# Patient Record
Sex: FEMALE | Race: WHITE | URBAN - METROPOLITAN AREA
[De-identification: names, ages, dates, MRNs, and addresses within clinical notes are randomized per-mention and may not be internally consistent; named-entity substitution may affect disease eponyms.]

---

## 2023-06-12 ENCOUNTER — APPOINTMENT (OUTPATIENT)
Dept: ULTRASOUND IMAGING | Age: 38
End: 2023-06-12
Payer: COMMERCIAL

## 2023-06-12 ENCOUNTER — HOSPITAL ENCOUNTER (EMERGENCY)
Age: 38
Discharge: HOME | End: 2023-06-13
Attending: EMERGENCY MEDICINE
Payer: COMMERCIAL

## 2023-06-12 ENCOUNTER — TELEPHONE (OUTPATIENT)
Dept: OBGYN CLINIC | Facility: CLINIC | Age: 38
End: 2023-06-12

## 2023-06-12 DIAGNOSIS — O03.9 SPONTANEOUS ABORTION: Primary | ICD-10-CM

## 2023-06-12 PROBLEM — O20.0 THREATENED MISCARRIAGE (HCC): Status: ACTIVE | Noted: 2023-06-12

## 2023-06-12 PROBLEM — Z87.19 HISTORY OF UMBILICAL HERNIA: Status: ACTIVE | Noted: 2023-06-12

## 2023-06-12 PROBLEM — O09.529 AMA (ADVANCED MATERNAL AGE) MULTIGRAVIDA 35+: Status: ACTIVE | Noted: 2023-06-12

## 2023-06-12 PROBLEM — O20.0 THREATENED MISCARRIAGE: Status: ACTIVE | Noted: 2023-06-12

## 2023-06-12 PROBLEM — O09.529 AMA (ADVANCED MATERNAL AGE) MULTIGRAVIDA 35+ (HCC): Status: ACTIVE | Noted: 2023-06-12

## 2023-06-12 PROBLEM — Z98.891 PREVIOUS CESAREAN SECTION: Status: ACTIVE | Noted: 2023-06-12

## 2023-06-12 LAB
ANTIBODY SCREEN: NEGATIVE
B-HCG SERPL-ACNC: 1309 MIU/ML
BASOPHILS # BLD AUTO: 0.03 X10(3) UL (ref 0–0.2)
BASOPHILS NFR BLD AUTO: 0.3 %
BLOOD GROUP ANTIBODY SCREEN [PRESENCE] IN SERUM OR PLASMA BY COLD ABSORPTION: NEGATIVE
EOSINOPHIL # BLD AUTO: 0.09 X10(3) UL (ref 0–0.7)
EOSINOPHIL NFR BLD AUTO: 1 %
ERYTHROCYTE [DISTWIDTH] IN BLOOD BY AUTOMATED COUNT: 13 %
HCT VFR BLD AUTO: 34.4 %
HGB BLD-MCNC: 11.3 G/DL
IMM GRANULOCYTES # BLD AUTO: 0.03 X10(3) UL (ref 0–1)
IMM GRANULOCYTES NFR BLD: 0.3 %
LYMPHOCYTES # BLD AUTO: 4.65 X10(3) UL (ref 1–4)
LYMPHOCYTES NFR BLD AUTO: 50.3 %
MCH RBC QN AUTO: 27.7 PG (ref 26–34)
MCHC RBC AUTO-ENTMCNC: 32.8 G/DL (ref 31–37)
MCV RBC AUTO: 84.3 FL
MONOCYTES # BLD AUTO: 0.5 X10(3) UL (ref 0.1–1)
MONOCYTES NFR BLD AUTO: 5.4 %
NEUTROPHILS # BLD AUTO: 3.95 X10 (3) UL (ref 1.5–7.7)
NEUTROPHILS # BLD AUTO: 3.95 X10(3) UL (ref 1.5–7.7)
NEUTROPHILS NFR BLD AUTO: 42.7 %
PLATELET # BLD AUTO: 240 10(3)UL (ref 150–450)
RBC # BLD AUTO: 4.08 X10(6)UL
RH BLOOD TYPE: POSITIVE
WBC # BLD AUTO: 9.3 X10(3) UL (ref 4–11)

## 2023-06-12 PROCEDURE — 76817 TRANSVAGINAL US OBSTETRIC: CPT

## 2023-06-12 PROCEDURE — 76801 OB US < 14 WKS SINGLE FETUS: CPT

## 2023-06-12 PROCEDURE — 99285 EMERGENCY DEPT VISIT HI MDM: CPT

## 2023-06-12 PROCEDURE — 86901 BLOOD TYPING SEROLOGIC RH(D): CPT

## 2023-06-12 PROCEDURE — 86850 RBC ANTIBODY SCREEN: CPT

## 2023-06-12 PROCEDURE — 85025 COMPLETE CBC W/AUTO DIFF WBC: CPT

## 2023-06-12 PROCEDURE — 86900 BLOOD TYPING SEROLOGIC ABO: CPT

## 2023-06-12 PROCEDURE — 96374 THER/PROPH/DIAG INJ IV PUSH: CPT

## 2023-06-12 PROCEDURE — 96372 THER/PROPH/DIAG INJ SC/IM: CPT

## 2023-06-12 PROCEDURE — 86850 RBC ANTIBODY SCREEN: CPT | Performed by: EMERGENCY MEDICINE

## 2023-06-12 PROCEDURE — 84702 CHORIONIC GONADOTROPIN TEST: CPT

## 2023-06-12 PROCEDURE — 90471 IMMUNIZATION ADMIN: CPT

## 2023-06-12 PROCEDURE — 96361 HYDRATE IV INFUSION ADD-ON: CPT

## 2023-06-12 NOTE — TELEPHONE ENCOUNTER
New Patient     LMP: 2023 - UPT May   EED: 2024  GA: 10w2d  : 2023 with Dr. Kimberlyn Lemon P2    Are cycles regular?: Yes  Medical problems: None  Past sx hx:  x1, umbilical hernia  Current medications: PNV and vitamins  Past pregnancy complications: , GDM with second pregnancy     Current concerns: Patient states she had spotting a few weeks ago but went away. Reports spotting since Friday morning, mild cramps. Has had to use a pad since the bleeding picked up a little. No recent intercourse. Pain and bleeding precautions given. Discussed spotting/mild cramps during early pregnancy and spotting after intercourse. No further questions. If you experience bleeding that is heavy enough that you are soaking through a large/super tampon or large/overnight pad in an hour or less, if you experience unmanageable/severe pain, have chest pain/chest heaviness, shortness of breath, or feel faint/dizzy, please go to the ER. I don't anticipate you experiencing this, but want you to know what to do just in case.

## 2023-06-12 NOTE — TELEPHONE ENCOUNTER
Patient calling to initiate prenatal care  LMP 4/1/23  Patient is 7-8 weeks on 5/27/23  Confirmation Ultrasound and Appointment scheduled on   Future Appointments   Date Time Provider Edi Abad   6/14/2023 10:00 AM [facility] US PLFD [facility] [address]   6/14/2023 11:00 AM Perico Villatoro MD [facility] [address]         Any history of ectopic pregnancy? No  Any history of miscarriage? Yes  Any medications that you are taking on a regular basis other than prenatal vitamins?  Iron and mag (if not taking prenatal vitamins, encourage patient to start taking.)  Any bleeding since the first day of last LMP and your positive pregnancy test? Yes, started Friday and continous but not heavy/ notices jelly like brown/ red color discharge    Insurance [facility]

## 2023-06-13 VITALS
OXYGEN SATURATION: 98 % | HEART RATE: 82 BPM | DIASTOLIC BLOOD PRESSURE: 61 MMHG | HEIGHT: 65 IN | TEMPERATURE: 99 F | SYSTOLIC BLOOD PRESSURE: 119 MMHG | BODY MASS INDEX: 37.15 KG/M2 | RESPIRATION RATE: 16 BRPM | WEIGHT: 223 LBS

## 2023-06-13 LAB — GLUCOSE BLD-MCNC: 132 MG/DL (ref 70–99)

## 2023-06-13 PROCEDURE — 82962 GLUCOSE BLOOD TEST: CPT

## 2023-06-13 RX ORDER — KETOROLAC TROMETHAMINE 30 MG/ML
30 INJECTION, SOLUTION INTRAMUSCULAR; INTRAVENOUS ONCE
Status: COMPLETED | OUTPATIENT
Start: 2023-06-13 | End: 2023-06-13

## 2023-06-13 RX ORDER — HYDROMORPHONE HYDROCHLORIDE 1 MG/ML
0.5 INJECTION, SOLUTION INTRAMUSCULAR; INTRAVENOUS; SUBCUTANEOUS ONCE
Status: DISCONTINUED | OUTPATIENT
Start: 2023-06-13 | End: 2023-06-13

## 2023-06-13 RX ORDER — METHYLERGONOVINE MALEATE 0.2 MG/ML
0.2 INJECTION INTRAVENOUS ONCE
Status: COMPLETED | OUTPATIENT
Start: 2023-06-13 | End: 2023-06-13

## 2023-06-13 RX ORDER — SODIUM CHLORIDE 9 MG/ML
INJECTION, SOLUTION INTRAVENOUS ONCE
Status: COMPLETED | OUTPATIENT
Start: 2023-06-13 | End: 2023-06-13

## 2023-06-14 ENCOUNTER — LAB ENCOUNTER (OUTPATIENT)
Dept: LAB | Age: 38
End: 2023-06-14
Attending: OBSTETRICS & GYNECOLOGY
Payer: COMMERCIAL

## 2023-06-14 LAB — B-HCG SERPL-ACNC: 448 MIU/ML

## 2023-06-14 PROCEDURE — 36415 COLL VENOUS BLD VENIPUNCTURE: CPT | Performed by: OBSTETRICS & GYNECOLOGY

## 2023-06-14 PROCEDURE — 84702 CHORIONIC GONADOTROPIN TEST: CPT | Performed by: OBSTETRICS & GYNECOLOGY

## 2024-10-09 ENCOUNTER — HOSPITAL ENCOUNTER (OUTPATIENT)
Dept: CT IMAGING | Age: 39
Discharge: HOME | End: 2024-10-09
Attending: OPHTHALMOLOGY
Payer: COMMERCIAL

## 2024-10-09 DIAGNOSIS — H57.89 THYROID EYE DISEASE: ICD-10-CM

## 2024-10-09 DIAGNOSIS — E07.9 THYROID EYE DISEASE: ICD-10-CM

## 2024-10-09 PROCEDURE — 70480 CT ORBIT/EAR/FOSSA W/O DYE: CPT | Performed by: OPHTHALMOLOGY

## 2024-12-26 ENCOUNTER — OFFICE VISIT (OUTPATIENT)
Facility: CLINIC | Age: 39
End: 2024-12-26
Payer: COMMERCIAL

## 2024-12-26 VITALS
WEIGHT: 211.63 LBS | DIASTOLIC BLOOD PRESSURE: 68 MMHG | RESPIRATION RATE: 18 BRPM | OXYGEN SATURATION: 99 % | SYSTOLIC BLOOD PRESSURE: 116 MMHG | HEIGHT: 65 IN | BODY MASS INDEX: 35.26 KG/M2 | HEART RATE: 70 BPM

## 2024-12-26 DIAGNOSIS — E55.9 VITAMIN D DEFICIENCY: ICD-10-CM

## 2024-12-26 DIAGNOSIS — E53.8 B12 DEFICIENCY: ICD-10-CM

## 2024-12-26 DIAGNOSIS — H57.89 THYROID EYE DISEASE: ICD-10-CM

## 2024-12-26 DIAGNOSIS — E61.1 IRON DEFICIENCY: ICD-10-CM

## 2024-12-26 DIAGNOSIS — E05.00 GRAVES DISEASE: Primary | ICD-10-CM

## 2024-12-26 DIAGNOSIS — E07.9 THYROID EYE DISEASE: ICD-10-CM

## 2024-12-26 PROCEDURE — 99205 OFFICE O/P NEW HI 60 MIN: CPT | Performed by: STUDENT IN AN ORGANIZED HEALTH CARE EDUCATION/TRAINING PROGRAM

## 2024-12-26 NOTE — PATIENT INSTRUCTIONS
Let's do lab work at your earliest convenience.   Depending on how those look, I'm leaning toward starting a low dose a PTU to control both the thyroid levels and the antibody levels.   I will reach out to your doctor about considering IV iron/B12.   Start that weekly vitamin D (ergocalciferol) because there is no IV for that.     General follow up information:  Please let us know if you require any refills at least 1 week prior to your medication running out. If you do run out of medication, please call our office ASAP to request refills (do not wait until your follow up).  Please call us if you experience any problems with insurance coverage of medication, lab work, or imaging.   Lab results and imaging will typically be reviewed at follow up appointments, or within 3-5 business days of ALL results being in if you do not have an appointment scheduled in the near future. Our office will contact you for any abnormal results requiring more urgent follow up or action.   The on-call pager is for urgent matters only. If you are a type 1 diabetic and run out of insulin after business hours 8AM-4PM, you may call the on-call pager for a refill to a 24 hour pharmacy. If you have adrenal insufficiency and run out of steroids, you may call the on-call pager for a refill to a 24 hour pharmacy. All other refill requests should be requested during business hours.    Return Visit   [] Dr. Duran in 6-8 weeks

## 2024-12-26 NOTE — H&P
Endocrinology Clinic Note    Name: Jennifer Ha    Date: 12/26/2024       HISTORY OF PRESENT ILLNESS   Jennifer Ha is a 39 year old female with PMHx significant for Graves' disease complicated by JESSICA who presents for initial endocrine consultation for Graves' disease. Patient was diagnosed with hyperthyroidism by labs several months ago, reports that symptoms worsened significantly over the summer and her primary care physician ordered lab results showing low TSH, normal free thyroid hormones, and positive TSI.  She also noted proptosis of the right eye and was seen by ophthalmology, who confirmed that the patient has thyroid eye disease - intervention was not recommended.  She has an 8-year-old and a 5-year-old and reportedly had normal thyroid function with both of those pregnancies. She unfortunately had a miscarriage with her most recent pregnancy and is hoping to conceive again as soon as possible.  Reports a high stress job, also recently lost her father who lived in [facility]. Her parents are .  She is not familiar with her father's side of the family with regard to medical history, but denies history of thyroid issues on her mother's side as far as she is aware.  Patient struggles with taking medication/pills due to very busy days and some health anxiety (prefers natural remedies).  She was recently found to have low B12, low iron and low vitamin D.  She denies any knowledge of GI issues or Celiac disease.  She was prescribed supplementation for these and has not been taking them.  Was taking Zepbound for >6 months, reports she has been overweight all her life and Zepbound did help her lose a notable amount of weight and help improve her eating habits. She has not been taking the last several months 10 pounds due to hoping to conceive again, reports that she is more conscious of what she is eating. Further data as below.     PAST MEDICAL HISTORY:   History reviewed. No pertinent past medical  history.    PAST SURGICAL HISTORY:   Past Surgical History:   Procedure Laterality Date    Hc  section level i      Repair ing hernia,7+y/o,reducibl         CURRENT MEDICATIONS:    No current outpatient medications on file.         ALLERGIES:  Allergies[1]    SOCIAL HISTORY:    Social History     Socioeconomic History    Marital status:    Tobacco Use    Smoking status: Never     Passive exposure: Never    Smokeless tobacco: Never   Vaping Use    Vaping status: Never Used   Substance and Sexual Activity    Alcohol use: Never    Drug use: Never       FAMILY HISTORY:   History reviewed. No pertinent family history.      REVIEW OF SYSTEMS:  Ten point review of systems has been performed and is otherwise negative and/or non-contributory, except as described above.      PHYSICAL EXAM:   Vitals:    24 1303   BP: 116/68   Pulse: 70   Resp: 18   SpO2: 99%   Weight: 211 lb 10.3 oz (96 kg)   Height: 5' 5\" (1.651 m)     BMI: Body mass index is 35.22 kg/m².     CONSTITUTIONAL:  awake, alert, cooperative, no apparent distress, and appears stated age  PSYCH: normal affect  EYES:  No proptosis,  conjunctiva normal  ENT:  Normocephalic, atraumatic  NECK:  Supple, symmetrical, thyroid not enlarged and no tenderness  LUNGS: breathing comfortably  CARDIOVASCULAR:  regular rate   ABDOMEN:  soft  SKIN:  no rashes and no lesions  EXTREMITIES: no edema      DATA:     Pertinent data reviewed 2024.     Labs from patient's phone from [place]:   -  -TSH 0.02  -FT4 1.2  -FT3 2.7  -B12, iron, vitamin D low      ASSESSMENT AND PLAN:    #Graves disease  #Thyroid eye disease  -Patient with low TSH, elevated TSI, and proven JESSICA consistent with diagnosis of Graves' disease  -Reviewed thyroid physiology and pathophysiology of Graves' disease and thyroid eye disease  -Reviewed options for therapy including ATD, thyroidectomy, UMANA (UMANA contraindicated given active JESSICA and pt also hoping to conceive in the near  future)  -Reviewed indications for treatment of subclinical hyperthyroidism and pregnancy considerations, risks/benefits of therapy with ATD in preconception and early pregnancy, and rationale for PTU versus MMI  -Recommend repeat TSH, FT4, total T3, TSI, TRAb  -Obtain baseline CBC/CMP in anticipation of ATD initiation  -Pending levels, recommend PTU as starting therapy as this is safer than MMI during first trimester of pregnancy  -Reviewed that untreated hyperthyroidism can be associated with increased risk of miscarriage/pregnancy failure, though technical targets for free thyroid hormones are slightly above nonpregnant range  -Consider ATD regardless of levels to reduce antibody burden in the setting of active JESSICA    #Vitamin D deficiency  -Patient advised to start ergocalciferol 50K weekly, reports this was previously prescribed by PCP    #Iron deficiency  #B12 deficiency  -Will obtain antibodies for pernicious anemia as concurrent autoimmune diseases are commong  -Pt may benefit from GI evaluation to review possible causes of malabsorption, pt reports good diet and does not avoid meat  -Reports she significantly struggles with taking oral medications/supplements and would rather take IV or IM options for repletion if possible - will reach out to PCP to discuss IV iron (may need to see heme) and/or IM B12 for the patient to improve adherence. She is struggling with extreme fatigue, likely affected by above.      The above plan was discussed in detail with the patient who verbalized understanding and agreement.      A total of 65 minutes was spent today on obtaining history, reviewing outside records, reviewing pertinent labs/imaging, reviewing relevant pathophysiology with patient, evaluating patient, providing multiple treatment options, communicating with patient's other providers as appropriate, and completing documentation and orders.      Yesica Duran DO  [facility]  12/26/2024     Note to patient:  The 21 Century Cures Act makes medical notes like these available to patients in the interest of transparency. However, be advised this is a medical document. It is intended as peer to peer communication. It is written in medical language and may contain abbreviations or verbiage that are unfamiliar. It may appear blunt or direct. Medical documents are intended to carry relevant information, facts as evident, and the clinical opinion of the practitioner.      In reviewing this note, please be advised that Dragon Voice Recognition software used to dictate the note may have made errors in recognizing some of the words or phrases.          [1] No Known Allergies

## 2025-01-02 LAB
ABSOLUTE BASOPHILS: 32 CELLS/UL (ref 0–200)
ABSOLUTE EOSINOPHILS: 170 CELLS/UL (ref 15–500)
ABSOLUTE LYMPHOCYTES: 2841 CELLS/UL (ref 850–3900)
ABSOLUTE MONOCYTES: 391 CELLS/UL (ref 200–950)
ABSOLUTE NEUTROPHILS: 2867 CELLS/UL (ref 1500–7800)
ALBUMIN/GLOBULIN RATIO: 1.3 (CALC) (ref 1–2.5)
ALBUMIN: 3.5 G/DL (ref 3.6–5.1)
ALKALINE PHOSPHATASE: 48 U/L (ref 31–125)
ALT: 13 U/L (ref 6–29)
AST: 11 U/L (ref 10–30)
BASOPHILS: 0.5 %
BILIRUBIN, TOTAL: 0.2 MG/DL (ref 0.2–1.2)
BUN: 16 MG/DL (ref 7–25)
CALCIUM: 8.7 MG/DL (ref 8.6–10.2)
CARBON DIOXIDE: 26 MMOL/L (ref 20–32)
CHLORIDE: 105 MMOL/L (ref 98–110)
CREATININE: 0.76 MG/DL (ref 0.5–0.97)
EGFR: 103 ML/MIN/1.73M2
EOSINOPHILS: 2.7 %
GLOBULIN: 2.7 G/DL (CALC) (ref 1.9–3.7)
GLUCOSE: 111 MG/DL (ref 65–99)
HEMATOCRIT: 37.9 % (ref 35–45)
HEMOGLOBIN: 12.1 G/DL (ref 11.7–15.5)
IMMUNOGLOBULIN A: 234 MG/DL (ref 47–310)
LYMPHOCYTES: 45.1 %
Lab: <20 U
Lab: NEGATIVE
MCH: 27 PG (ref 27–33)
MCHC: 31.9 G/DL (ref 32–36)
MCV: 84.6 FL (ref 80–100)
MONOCYTES: 6.2 %
MPV: 10.8 FL (ref 7.5–12.5)
NEUTROPHILS: 45.5 %
PLATELET COUNT: 243 THOUSAND/UL (ref 140–400)
POTASSIUM: 4.4 MMOL/L (ref 3.5–5.3)
PROTEIN, TOTAL: 6.2 G/DL (ref 6.1–8.1)
RDW: 13.5 % (ref 11–15)
RED BLOOD CELL COUNT: 4.48 MILLION/UL (ref 3.8–5.1)
SODIUM: 137 MMOL/L (ref 135–146)
T3, TOTAL: 100 NG/DL (ref 76–181)
T4, FREE: 1 NG/DL (ref 0.8–1.8)
TISSUE TRANSGLUTAMINASE$ANTIBODY, IGA: <1 U/ML
TRAB: 4.22 IU/L
TSH: 0.11 MIU/L
TSI: 160 % BASELINE
WHITE BLOOD CELL COUNT: 6.3 THOUSAND/UL (ref 3.8–10.8)

## 2025-01-08 ENCOUNTER — TELEPHONE (OUTPATIENT)
Facility: CLINIC | Age: 40
End: 2025-01-08

## 2025-01-08 DIAGNOSIS — E05.00 GRAVES DISEASE: Primary | ICD-10-CM

## 2025-01-08 RX ORDER — PROPYLTHIOURACIL 50 MG/1
TABLET ORAL
Qty: 180 TABLET | Refills: 0 | Status: SHIPPED | OUTPATIENT
Start: 2025-01-08

## 2025-01-08 NOTE — TELEPHONE ENCOUNTER
Lab result note: -TSH remains low, suggestive of thyroid overactivity with her Graves' disease  -I messaged her PCP to suggest IV or intramuscular replacement for her iron/B12, did not hear back so she can follow up with him directly about starting on those things. Her blood tests were negative for Celiac, pernicious anemia, etc but the way to rule those out 100% is with endoscopy with GI (again can discuss with PCP if this is recommended)     -To control her antibody levels, help stop progression of her eye disease, and hopefully normalize TSH, since she is thinking of conceiving hopefully in the near future, I suggest she start on the lowest dose of PTU 50mg BID (once with breakfast and once with dinner). For context, some patients take up to 600mg+ daily so this is a very low dose.  -If any itching, rash, yellowing of the skin or eyes after starting this medication she should stop immediately and let me know.     -Repeat TSH, FT4, total T3, T4 by dialysis 4-6 weeks after starting PTU.    RN phoned patient to discuss lab result notes: patient verbalized understanding and confirmed pharmacy for prescription.    Patient does intermittent fasting- two days a week she does dry fasting and breaks fast at 4:30- on these days ok to take one tablet then and then with dinner? Ok to take both at once? Routed for review.    Prescription and repeat labs ordered in TE- dialysis lab to be faxed to [facility] in [place]. Then once answer given to question above, will send follow up mychart with all information from labs and recommendations from provider.     Will need to fax T4 by dialysis:[facility]: handy Fax   [phone]

## 2025-01-08 NOTE — TELEPHONE ENCOUNTER
[facility]     Faxed T4 by dialysis to [facility], awaiting confirmation

## 2025-01-08 NOTE — TELEPHONE ENCOUNTER
It's okay to take it with or without food so on her intermittent fasting days, she can take it in the morning with water (if that's okay). If she doesn't even drink water during her fast days then yes, she can take both at once on those evenings OR take one tab at 4:30PM and one later in the evening around 9-10PM with a snack. Thanks!

## 2025-02-27 ENCOUNTER — LAB ENCOUNTER (OUTPATIENT)
Dept: LAB | Age: 40
End: 2025-02-27
Attending: STUDENT IN AN ORGANIZED HEALTH CARE EDUCATION/TRAINING PROGRAM
Payer: COMMERCIAL

## 2025-02-27 ENCOUNTER — OFFICE VISIT (OUTPATIENT)
Facility: CLINIC | Age: 40
End: 2025-02-27
Payer: COMMERCIAL

## 2025-02-27 VITALS
OXYGEN SATURATION: 99 % | WEIGHT: 214 LBS | HEIGHT: 65 IN | HEART RATE: 79 BPM | DIASTOLIC BLOOD PRESSURE: 80 MMHG | SYSTOLIC BLOOD PRESSURE: 122 MMHG | RESPIRATION RATE: 18 BRPM | BODY MASS INDEX: 35.65 KG/M2

## 2025-02-27 DIAGNOSIS — E05.00 GRAVES DISEASE: Primary | ICD-10-CM

## 2025-02-27 DIAGNOSIS — R73.03 PREDIABETES: ICD-10-CM

## 2025-02-27 DIAGNOSIS — E05.00 GRAVES DISEASE: ICD-10-CM

## 2025-02-27 LAB
EST. AVERAGE GLUCOSE BLD GHB EST-MCNC: 126 MG/DL (ref 68–126)
HBA1C MFR BLD: 6 % (ref ?–5.7)
T3 SERPL-MCNC: 1.11 NG/ML (ref 0.6–1.81)
T4 FREE SERPL-MCNC: 1.2 NG/DL (ref 0.8–1.7)
TSI SER-ACNC: 0.07 UIU/ML (ref 0.55–4.78)

## 2025-02-27 PROCEDURE — 84480 ASSAY TRIIODOTHYRONINE (T3): CPT

## 2025-02-27 PROCEDURE — 99215 OFFICE O/P EST HI 40 MIN: CPT | Performed by: STUDENT IN AN ORGANIZED HEALTH CARE EDUCATION/TRAINING PROGRAM

## 2025-02-27 PROCEDURE — 83036 HEMOGLOBIN GLYCOSYLATED A1C: CPT

## 2025-02-27 PROCEDURE — 84439 ASSAY OF FREE THYROXINE: CPT

## 2025-02-27 PROCEDURE — 36415 COLL VENOUS BLD VENIPUNCTURE: CPT

## 2025-02-27 PROCEDURE — 84443 ASSAY THYROID STIM HORMONE: CPT

## 2025-02-27 RX ORDER — ERGOCALCIFEROL 1.25 MG/1
1 CAPSULE ORAL
COMMUNITY

## 2025-02-27 RX ORDER — FERROUS SULFATE 325(65) MG
TABLET ORAL
COMMUNITY

## 2025-02-27 RX ORDER — MELATONIN
1000 DAILY
COMMUNITY
Start: 2024-09-27

## 2025-02-27 NOTE — PROGRESS NOTES
Please let patient know A1c is consistent with prediabetes. We typically suggest A1c <6.5% in patients trying to conceive.     TSH remains low so the PTU 50mg BID is not enough to normalize her thyroid function, I suggest going up to 150 (50mg TID or 100mg in the AM and 50mg in the PM), then repeating labs in 4-6 weeks. I'll place orders. Thanks!

## 2025-02-27 NOTE — PATIENT INSTRUCTIONS
Let's do some new labs today both to see where the thyroid numbers are and where your sugar is.   Some of our OB doctors are: Dr. Cuellar, Dr. James, Dr. Strauss.     General follow up information:  Please let us know if you require any refills at least 1 week prior to your medication running out. If you do run out of medication, please call our office ASAP to request refills (do not wait until your follow up).  Please call us if you experience any problems with insurance coverage of medication, lab work, or imaging.   Lab results and imaging will typically be reviewed at follow up appointments, or within 3-5 business days of ALL results being in if you do not have an appointment scheduled in the near future. Our office will contact you for any abnormal results requiring more urgent follow up or action.   The on-call pager is for urgent matters only. If you are a type 1 diabetic and run out of insulin after business hours 8AM-4PM, you may call the on-call pager for a refill to a 24 hour pharmacy. If you have adrenal insufficiency and run out of steroids, you may call the on-call pager for a refill to a 24 hour pharmacy. All other refill requests should be requested during business hours.    Return Visit   [] Dr. Duran in 3-4 months

## 2025-02-27 NOTE — PROGRESS NOTES
Endocrinology Clinic Note    Name: Jennifer Ha    Date: 2/27/2025       HISTORY OF PRESENT ILLNESS   Jennifer Ha is a 39 year old female with PMHx significant for Graves' disease complicated by JESSICA who presents for initial endocrine consultation for Graves' disease. Patient was diagnosed with hyperthyroidism by labs several months ago, reports that symptoms worsened significantly over the summer and her primary care physician ordered lab results showing low TSH, normal free thyroid hormones, and positive TSI.  She also noted proptosis of the right eye and was seen by ophthalmology, who confirmed that the patient has thyroid eye disease - intervention was not recommended.  She has an 8-year-old and a 5-year-old and reportedly had normal thyroid function with both of those pregnancies. She unfortunately had a miscarriage with her most recent pregnancy and is hoping to conceive again as soon as possible.  Reports a high stress job, also recently lost her father who lived in [facility]. Her parents are .  She is not familiar with her father's side of the family with regard to medical history, but denies history of thyroid issues on her mother's side as far as she is aware.  Patient struggles with taking medication/pills due to very busy days and some health anxiety (prefers natural remedies).  She was recently found to have low B12, low iron and low vitamin D.  She denies any knowledge of GI issues or Celiac disease.  She was prescribed supplementation for these and has not been taking them.  Was taking Zepbound for >6 months, reports she has been overweight all her life and Zepbound did help her lose a notable amount of weight and help improve her eating habits. She has not been taking the last several months 10 pounds due to hoping to conceive again, reports that she is more conscious of what she is eating. Further data as below.     Interval history 2/27/2025:  -Labs 12/27/2024: TSH 0.11, free T4 1.0, total  T3 100, %, TRAb 4.22  -Patient was started on PTU 50mg BID  -Reports she has gained about 15lb since discontinuing Zepbound  -Suppressed period with medication in late  for pilgrimage to [place], after returning missed two menstrual periods but period returned spontaneously the first week of February  -She is trying to conceive  -Notes swelling of hands and feet particularly at night, tends to resolve by midday, trying to watch her sodium  -Looking for new OB practice    PAST MEDICAL HISTORY:   History reviewed. No pertinent past medical history.    PAST SURGICAL HISTORY:   Past Surgical History:   Procedure Laterality Date    Hc  section level i      Repair ing hernia,7+y/o,reducibl         CURRENT MEDICATIONS:    Current Outpatient Medications   Medication Sig Dispense Refill    cyanocobalamin 1000 MCG Oral Tab Take 1 tablet (1,000 mcg total) by mouth daily.      ergocalciferol 1.25 MG (17239 UT) Oral Cap 1 capsule (50,000 Units total).      Ferrous Sulfate 325 (65 Fe) MG Oral Tab 1 tablet Orally every other day for 90 day(s)      propylthiouracil 50 MG Oral Tab Take 2 tablets daily for a total of 100 mg. 180 tablet 0     Endocrine Medications            propylthiouracil 50 MG Oral Tab            ALLERGIES:  Allergies[1]    SOCIAL HISTORY:    Social History     Socioeconomic History    Marital status:    Tobacco Use    Smoking status: Never     Passive exposure: Never    Smokeless tobacco: Never   Vaping Use    Vaping status: Never Used   Substance and Sexual Activity    Alcohol use: Never    Drug use: Never       FAMILY HISTORY:   History reviewed. No pertinent family history.      REVIEW OF SYSTEMS:  Ten point review of systems has been performed and is otherwise negative and/or non-contributory, except as described above.      PHYSICAL EXAM:   Vitals:    25 0852   BP: 122/80   Pulse: 79   Resp: 18   SpO2: 99%   Weight: 214 lb (97.1 kg)   Height: 5' 5\" (1.651 m)     BMI: Body mass  index is 35.61 kg/m².     CONSTITUTIONAL:  awake, alert, cooperative, no apparent distress, and appears stated age  PSYCH: normal affect  EYES:  No proptosis,  conjunctiva normal  ENT:  Normocephalic, atraumatic  NECK:  Supple, symmetrical, thyroid not enlarged and no tenderness  LUNGS: breathing comfortably  CARDIOVASCULAR:  regular rate   ABDOMEN:  soft  SKIN:  no rashes and no lesions  EXTREMITIES: no edema      DATA:     Pertinent data reviewed 2/27/2025.     Labs from patient's phone from [facility]:   -  -TSH 0.02  -FT4 1.2  -FT3 2.7  -B12, iron, vitamin D low      ASSESSMENT AND PLAN:    #Graves disease  #Thyroid eye disease  -Patient with low TSH, elevated TSI, and proven JESSICA consistent with diagnosis of Graves' disease  -Reviewed thyroid physiology and pathophysiology of Graves' disease and thyroid eye disease  -Reviewed options for therapy including ATD, thyroidectomy, UMANA (UMANA contraindicated given active JESSICA and pt also hoping to conceive in the near future)  -Reviewed indications for treatment of subclinical hyperthyroidism and pregnancy considerations, risks/benefits of therapy with ATD in preconception and early pregnancy, and rationale for PTU versus MMI  -Positive TSI/TRAb  -Repeat lab work today shows persistent TSH suppression on PTU 50mg BID, pt reports adherence  -Increase PTU to 150mg daily divided and repeat thyroid function in 4-6 weeks  -Reviewed that untreated hyperthyroidism can be associated with increased risk of miscarriage/pregnancy failure, though technical targets for free thyroid hormones are slightly above nonpregnant range    #Vitamin D deficiency  #Iron deficiency  #B12 deficiency  -Follow up with primary care provider    #Prediabetes/gestational DM  -Repeat A1c today      The above plan was discussed in detail with the patient who verbalized understanding and agreement.      A total of 45 minutes was spent today on obtaining history, reviewing outside records, reviewing  pertinent labs/imaging, reviewing relevant pathophysiology with patient, evaluating patient, providing multiple treatment options, communicating with patient's other providers as appropriate, and completing documentation and orders.      Yesica Duran DO  [facility] Endocrin[facility]  2/27/25     Note to patient: The 21 Century Cures Act makes medical notes like these available to patients in the interest of transparency. However, be advised this is a medical document. It is intended as peer to peer communication. It is written in medical language and may contain abbreviations or verbiage that are unfamiliar. It may appear blunt or direct. Medical documents are intended to carry relevant information, facts as evident, and the clinical opinion of the practitioner.      In reviewing this note, please be advised that Dragon Voice Recognition software used to dictate the note may have made errors in recognizing some of the words or phrases.          [1] No Known Allergies

## 2025-03-05 ENCOUNTER — TELEPHONE (OUTPATIENT)
Facility: CLINIC | Age: 40
End: 2025-03-05

## 2025-03-05 DIAGNOSIS — E05.00 GRAVES DISEASE: Primary | ICD-10-CM

## 2025-03-05 LAB — T4 FREE DIALYSIS/MS: 1.1 NG/DL

## 2025-03-05 RX ORDER — PROPYLTHIOURACIL 50 MG/1
150 TABLET ORAL 3 TIMES DAILY
Qty: 270 TABLET | Refills: 1 | Status: SHIPPED | OUTPATIENT
Start: 2025-03-05

## 2025-03-05 NOTE — TELEPHONE ENCOUNTER
Patient phoned in needing refill.    Reviewed and confirmed pharmacy on file.     Patient increased to PTU 50 mg TID. Sent per written order- I suggest going up to 150 (50mg TID or 100mg in the AM and 50mg in the PM), then repeating labs in 4-6 weeks

## 2025-03-06 ENCOUNTER — PATIENT MESSAGE (OUTPATIENT)
Facility: CLINIC | Age: 40
End: 2025-03-06

## 2025-03-07 NOTE — TELEPHONE ENCOUNTER
I would suggest starting with the PCP for eval of alternate causes because it's very unusual for PTU to cause fluid retention, if no other causes are identified we can then try to hold the PTU to see if that resolves the issue and then we could discuss alternate therapies for the Graves' disease.

## 2025-05-14 ENCOUNTER — TELEPHONE (OUTPATIENT)
Dept: OBGYN CLINIC | Facility: CLINIC | Age: 40
End: 2025-05-14

## 2025-05-14 ENCOUNTER — TELEPHONE (OUTPATIENT)
Facility: CLINIC | Age: 40
End: 2025-05-14

## 2025-05-14 DIAGNOSIS — N91.2 AMENORRHEA: Primary | ICD-10-CM

## 2025-05-14 DIAGNOSIS — E05.90 HYPERTHYROIDISM AFFECTING PREGNANCY IN FIRST TRIMESTER (HCC): ICD-10-CM

## 2025-05-14 DIAGNOSIS — O99.281 HYPERTHYROIDISM AFFECTING PREGNANCY IN FIRST TRIMESTER (HCC): ICD-10-CM

## 2025-05-14 DIAGNOSIS — R73.03 PREDIABETES: ICD-10-CM

## 2025-05-14 DIAGNOSIS — E05.00 GRAVES DISEASE: Primary | ICD-10-CM

## 2025-05-14 DIAGNOSIS — Z3A.01 LESS THAN 8 WEEKS GESTATION OF PREGNANCY (HCC): ICD-10-CM

## 2025-05-14 DIAGNOSIS — O99.810 PREDIABETES IN MOTHER DURING PREGNANCY (HCC): ICD-10-CM

## 2025-05-14 NOTE — TELEPHONE ENCOUNTER
Telephoned in regarding propylthiouracil 50 mg three times a day for graves disease patient is not compliant with drug. Patient has not taken it for three weeks. Patient took a pregnancy test and it came back positive. Patient wanting recommendation on how to move forward. Should she restart medication. Should she get testing labs. Routed for review.

## 2025-05-14 NOTE — TELEPHONE ENCOUNTER
Patient calling to initiate prenatal care  LMP 04/20/25  Patient is 7-8 weeks on 06/15/25  Confirmation of pregnancy appointment scheduled on   Future Appointments   Date Time Provider Department Center   6/13/2025  8:00 AM Yesica Duran DO [id number] [facility] [place]   6/18/2025 12:15 PM [facility] OB US PLFD [facility] [address]   6/18/2025  1:00 PM Alix Apple DO [facility] [address]   7/15/2025 11:30 AM Carmen See APRN [facility] [address]       Insurance [facility]    Any history of ectopic pregnancy? n  Any history of miscarriage? 2 previous m/c  Any medications that you are taking on a regular basis other than prenatal vitamins? propacil (if not taking prenatal vitamins, encourage patient to start taking.)  Any bleeding since the first day of last LMP and your positive pregnancy test? Y- 2days ago and then yesterday but not today.   Patient has gest diabetes in her second pregnancy

## 2025-05-14 NOTE — TELEPHONE ENCOUNTER
A1c is not particularly reliable in pregnant patients due to shifts in hormones and blood cells, we instead have people monitor fingerstick sugars and her OB would have her do a glucose test. If she does not have glucose monitoring supplies I can send over a glucometer and she can also do the labs fasting tomorrow so we can get a sense of fasting sugars. I will also refer her to our CDE for discussion of diet management in pregnancy. Recommend she make a pregnancy confirmation appointment with OB/GYN as soon as possible; I can order a pregnancy blood test if she would like but those can be misleading in early phases and the best way would be with a proper OB appointment. Please let me know if she needs the glucose monitoring supplies.

## 2025-05-14 NOTE — TELEPHONE ENCOUNTER
Telephoned patient and was unable to leave a message. The voicemail has not been set up yet. Sent a my chart message. Patient telephoned back Patient is requesting if we can add a pregnancy test and a A1C test as well. Pended for your review.

## 2025-05-15 ENCOUNTER — LAB ENCOUNTER (OUTPATIENT)
Dept: LAB | Age: 40
End: 2025-05-15
Attending: STUDENT IN AN ORGANIZED HEALTH CARE EDUCATION/TRAINING PROGRAM
Payer: COMMERCIAL

## 2025-05-15 DIAGNOSIS — E05.90 HYPERTHYROIDISM AFFECTING PREGNANCY IN FIRST TRIMESTER (HCC): ICD-10-CM

## 2025-05-15 DIAGNOSIS — Z3A.01 LESS THAN 8 WEEKS GESTATION OF PREGNANCY (HCC): ICD-10-CM

## 2025-05-15 DIAGNOSIS — E05.00 GRAVES DISEASE: ICD-10-CM

## 2025-05-15 DIAGNOSIS — O99.281 HYPERTHYROIDISM AFFECTING PREGNANCY IN FIRST TRIMESTER (HCC): ICD-10-CM

## 2025-05-15 LAB
ALBUMIN SERPL-MCNC: 4.5 G/DL (ref 3.2–4.8)
ALBUMIN/GLOB SERPL: 1.5 {RATIO} (ref 1–2)
ALP LIVER SERPL-CCNC: 54 U/L (ref 37–98)
ALT SERPL-CCNC: 12 U/L (ref 10–49)
ANION GAP SERPL CALC-SCNC: 7 MMOL/L (ref 0–18)
AST SERPL-CCNC: 12 U/L (ref ?–34)
B-HCG SERPL-ACNC: 97.4 MIU/ML (ref ?–4.2)
BILIRUB SERPL-MCNC: 0.3 MG/DL (ref 0.3–1.2)
BUN BLD-MCNC: 17 MG/DL (ref 9–23)
CALCIUM BLD-MCNC: 9.4 MG/DL (ref 8.7–10.6)
CHLORIDE SERPL-SCNC: 107 MMOL/L (ref 98–112)
CO2 SERPL-SCNC: 23 MMOL/L (ref 21–32)
CREAT BLD-MCNC: 0.74 MG/DL (ref 0.55–1.02)
EGFRCR SERPLBLD CKD-EPI 2021: 106 ML/MIN/1.73M2 (ref 60–?)
ERYTHROCYTE [DISTWIDTH] IN BLOOD BY AUTOMATED COUNT: 12.5 %
EST. AVERAGE GLUCOSE BLD GHB EST-MCNC: 128 MG/DL (ref 68–126)
FASTING STATUS PATIENT QL REPORTED: YES
GLOBULIN PLAS-MCNC: 3 G/DL (ref 2–3.5)
GLUCOSE BLD-MCNC: 121 MG/DL (ref 70–99)
HBA1C MFR BLD: 6.1 % (ref ?–5.7)
HCT VFR BLD AUTO: 40.1 % (ref 35–48)
HGB BLD-MCNC: 13 G/DL (ref 12–16)
MCH RBC QN AUTO: 27.7 PG (ref 26–34)
MCHC RBC AUTO-ENTMCNC: 32.4 G/DL (ref 31–37)
MCV RBC AUTO: 85.3 FL (ref 80–100)
OSMOLALITY SERPL CALC.SUM OF ELEC: 287 MOSM/KG (ref 275–295)
PLATELET # BLD AUTO: 293 10(3)UL (ref 150–450)
POTASSIUM SERPL-SCNC: 4.2 MMOL/L (ref 3.5–5.1)
PROT SERPL-MCNC: 7.5 G/DL (ref 5.7–8.2)
RBC # BLD AUTO: 4.7 X10(6)UL (ref 3.8–5.3)
SODIUM SERPL-SCNC: 137 MMOL/L (ref 136–145)
T3 SERPL-MCNC: 1.36 NG/ML (ref 0.6–1.81)
T4 FREE SERPL-MCNC: 1.5 NG/DL (ref 0.8–1.7)
TSI SER-ACNC: 0.04 UIU/ML (ref 0.55–4.78)
WBC # BLD AUTO: 7.7 X10(3) UL (ref 4–11)

## 2025-05-15 PROCEDURE — 84439 ASSAY OF FREE THYROXINE: CPT

## 2025-05-15 PROCEDURE — 83520 IMMUNOASSAY QUANT NOS NONAB: CPT

## 2025-05-15 PROCEDURE — 36415 COLL VENOUS BLD VENIPUNCTURE: CPT

## 2025-05-15 PROCEDURE — 84445 ASSAY OF TSI GLOBULIN: CPT

## 2025-05-15 PROCEDURE — 84443 ASSAY THYROID STIM HORMONE: CPT

## 2025-05-15 PROCEDURE — 83036 HEMOGLOBIN GLYCOSYLATED A1C: CPT | Performed by: STUDENT IN AN ORGANIZED HEALTH CARE EDUCATION/TRAINING PROGRAM

## 2025-05-15 PROCEDURE — 84702 CHORIONIC GONADOTROPIN TEST: CPT

## 2025-05-15 PROCEDURE — 85027 COMPLETE CBC AUTOMATED: CPT

## 2025-05-15 PROCEDURE — 84480 ASSAY TRIIODOTHYRONINE (T3): CPT

## 2025-05-15 PROCEDURE — 80053 COMPREHEN METABOLIC PANEL: CPT

## 2025-05-15 NOTE — TELEPHONE ENCOUNTER
Patient phoned office following up on A1C order- reviewed Dr. Duran's message below.    [email or web address] message sent as well.    Closing this encounter.

## 2025-05-15 NOTE — PROGRESS NOTES
TSH is low and we want to keep T4 at the upper limit of normal, suggest she resume PTU 50mg ONCE per day for now. Several other levels are pending and we'll touch base when they are in. Hcg is positive, will need confirmation of pregnancy with OB. A1c pending but as less reliable, would like her to keep track of fingersticks or we can try to get her a CGM if pregnancy is confirmed and would like her to meet with Alexander

## 2025-05-17 LAB
THY STIM IMMUNO: 3.23 IU/L
THYROTROPIN REC AB: 4.31 IU/L

## 2025-05-29 ENCOUNTER — TELEPHONE (OUTPATIENT)
Dept: OBGYN CLINIC | Facility: CLINIC | Age: 40
End: 2025-05-29

## 2025-05-29 NOTE — TELEPHONE ENCOUNTER
Pt called stating she noticed some spotting/bleeding and states she has a history of miscarriage and is wondering if lab order's can be placed to check HCG levels. Please advise, thank you.

## 2025-05-29 NOTE — TELEPHONE ENCOUNTER
5w4d per LMP  6/18/2025- /US appt scheduled  Patient is non-established    Spotted x 1 over a week ago  Spotting returned yesterday, slightly red earlier now pink/brown, when wiping only.  Occasional mild cramps, had migraine yesterday and occasional dry mouth.  Patient admits she may be a bit dehydrated.  Discussed importance of staying well hydrated, as symptoms of dehydration include everything she is reporting.  Instructed to increase fluid intake and monitor symptoms.  ER precautions for pain/bleeding reviewed..  If symptoms persist or worsen, instructed to schedule office visit to establish care.  Patient verbalized understanding.

## 2025-06-02 ENCOUNTER — OFFICE VISIT (OUTPATIENT)
Dept: OBGYN CLINIC | Facility: CLINIC | Age: 40
End: 2025-06-02
Payer: COMMERCIAL

## 2025-06-02 VITALS
SYSTOLIC BLOOD PRESSURE: 120 MMHG | HEART RATE: 73 BPM | HEIGHT: 65 IN | BODY MASS INDEX: 35.16 KG/M2 | DIASTOLIC BLOOD PRESSURE: 82 MMHG | WEIGHT: 211 LBS

## 2025-06-02 DIAGNOSIS — O20.0 THREATENED MISCARRIAGE (HCC): Primary | ICD-10-CM

## 2025-06-02 RX ORDER — LORATADINE 10 MG/1
1 TABLET ORAL DAILY
COMMUNITY

## 2025-06-02 NOTE — TELEPHONE ENCOUNTER
Bleeding since 5/29, increased from spotting.  No blood present on pad, seeing more blood when wiping similar to when she is on menses. Blood appears maroon-red and passing a few small dime-sized to quarter-sized clots intermittently.  No abdominal pain, does feel pressure.  Patient believes she may be miscarrying.  Appt scheduled for today to establish care.

## 2025-06-02 NOTE — TELEPHONE ENCOUNTER
Pt called back to let us know that she is still bleeding and it is worse than what it was compared to a few days ago when she spoke to the nurse. Please advise

## 2025-06-02 NOTE — PROGRESS NOTES
[facility]  Obstetrics and Gynecology    Subjective:     Jennifer Ha is a 40 year old  who presents with c/o vaginal bleeding in early pregnancy. She states she had a small amount of spotting about a week ago, thought was normal implantation bleeding and then it stopped. However over the weekend she developed slightly heavier bleeding - still not as much as a period, which she thought was abnormal. H/o two SAB - more recent one in  caused a hemorrhage at home, so she is very worried about this happening again while she is alone at home with her kids.     Patient's last menstrual period was 2025 (exact date).    OB History    Para Term  AB Living   5 2 2  2 2   SAB IAB Ectopic Multiple Live Births   2    2      # Outcome Date GA Lbr Mak/2nd Weight Sex Type Anes PTL Lv   5 Current            4 Term 19 38w5d  8 lb 5 oz (3.771 kg) F CS-LTranv EPI  SARAH   3 SAB 2018 5w0d             Birth Comments: no D&C   2 2018           1 Term 16 40w3d  7 lb 5 oz (3.317 kg) F Vag-Spont EPI  SARAH      Birth Comments: Had a bad experience with Epidural.         Menarche: 14 (2025 11:26 AM)  Period Cycle (Days): pregnant (2025 11:26 AM)  Period Duration (Days): n/a (2025 11:26 AM)  Period Flow: n/a (2025 11:26 AM)  Hx Prior Abnormal Pap: No (2025 11:26 AM)  Pap Date: -- ( per pt) (2025 11:26 AM)       Objective:     Vitals:    25 1128   BP: 120/82   Pulse: 73   Weight: 211 lb (95.7 kg)   Height: 65\"       Body mass index is 35.11 kg/m².    GEN: AAOx3, NAD, appears well, appears stated age  RESP: breathing comfortably      Assessment:     Jennifer Ha is a 40 year old  with threatened miscarriage.       Plan:     -- counseled regarding bleeding in early pregnancy, recommend checking serial hcg and serum progesterone level and will follow up with confirmatory ultrasound next week (offered to reschedule to a week sooner due to  her history and current anxiety).   -- ED/SAB precautions reviewed    -- Follow up as scheduled or sooner as needed    Total time was 30 minutes, more than 50% of the time was spent in face-to-face counseling and/or coordination of care.      Carleen Riojas MD  [facility]  6/2/2025 11:32 AM

## 2025-06-03 ENCOUNTER — PATIENT MESSAGE (OUTPATIENT)
Dept: OBGYN CLINIC | Facility: CLINIC | Age: 40
End: 2025-06-03

## 2025-06-03 DIAGNOSIS — N91.2 AMENORRHEA: Primary | ICD-10-CM

## 2025-06-03 DIAGNOSIS — O20.0 THREATENED MISCARRIAGE (HCC): ICD-10-CM

## 2025-06-03 LAB
HCG, TOTAL, QN: ABNORMAL MIU/ML
PROGESTERONE: 12.7 NG/ML
T4 FREE DIALYSIS/MS: 0.95 NG/DL

## 2025-06-04 ENCOUNTER — PATIENT MESSAGE (OUTPATIENT)
Facility: CLINIC | Age: 40
End: 2025-06-04

## 2025-06-04 DIAGNOSIS — O99.281 HYPERTHYROIDISM AFFECTING PREGNANCY IN FIRST TRIMESTER (HCC): ICD-10-CM

## 2025-06-04 DIAGNOSIS — E05.00 GRAVES DISEASE: Primary | ICD-10-CM

## 2025-06-04 DIAGNOSIS — E05.90 HYPERTHYROIDISM AFFECTING PREGNANCY IN FIRST TRIMESTER (HCC): ICD-10-CM

## 2025-06-05 LAB — HCG, TOTAL, QN: ABNORMAL MIU/ML

## 2025-06-12 ENCOUNTER — OFFICE VISIT (OUTPATIENT)
Dept: OBGYN CLINIC | Facility: CLINIC | Age: 40
End: 2025-06-12
Payer: COMMERCIAL

## 2025-06-12 ENCOUNTER — ULTRASOUND ENCOUNTER (OUTPATIENT)
Dept: OBGYN CLINIC | Facility: CLINIC | Age: 40
End: 2025-06-12
Payer: COMMERCIAL

## 2025-06-12 VITALS
BODY MASS INDEX: 35.38 KG/M2 | HEIGHT: 65 IN | DIASTOLIC BLOOD PRESSURE: 70 MMHG | WEIGHT: 212.38 LBS | SYSTOLIC BLOOD PRESSURE: 122 MMHG | HEART RATE: 79 BPM

## 2025-06-12 DIAGNOSIS — E05.00 GRAVES DISEASE: ICD-10-CM

## 2025-06-12 DIAGNOSIS — N91.2 AMENORRHEA: ICD-10-CM

## 2025-06-12 DIAGNOSIS — O09.521 MULTIGRAVIDA OF ADVANCED MATERNAL AGE IN FIRST TRIMESTER (HCC): ICD-10-CM

## 2025-06-12 DIAGNOSIS — Z98.891 PREVIOUS CESAREAN SECTION: ICD-10-CM

## 2025-06-12 DIAGNOSIS — N91.1 SECONDARY AMENORRHEA: Primary | ICD-10-CM

## 2025-06-12 DIAGNOSIS — Z87.19 HISTORY OF UMBILICAL HERNIA: ICD-10-CM

## 2025-06-12 DIAGNOSIS — R73.03 PREDIABETES: ICD-10-CM

## 2025-06-12 PROCEDURE — 76856 US EXAM PELVIC COMPLETE: CPT | Performed by: STUDENT IN AN ORGANIZED HEALTH CARE EDUCATION/TRAINING PROGRAM

## 2025-06-12 PROCEDURE — 99214 OFFICE O/P EST MOD 30 MIN: CPT | Performed by: STUDENT IN AN ORGANIZED HEALTH CARE EDUCATION/TRAINING PROGRAM

## 2025-06-12 NOTE — PATIENT INSTRUCTIONS
[facility]- Prenatal Testing    The following information is designed to help you understand some of the optional tests that are available to you to screen for problems in your pregnancy.  Before considering any of these tests, you will need to consider the following questions:    [1] What would you do if an abnormality were detected?  If the answer is nothing, then you may decide to decline all testing.  [2] Would you be willing to undergo testing which might increase your risk of miscarriage, such as an amniocentesis or CVS (see below)?  [3] If you have the initial testing, and it indicates that there might be a problem, and you subsequently decide not to do invasive testing such as an amniocentesis, would you worry excessively through the remainder of the pregnancy?    The following tests are available to screen for problems in your pregnancy:      [1]  First Trimester Screening (also called Nuchal Thickness, Nuchal Translucency or NT)- This is an abdominal ultrasound done between 10 and 14 weeks by a perinatology specialist (Maternal Fetal Medicine, MFM) which measures the thickness of the skin behind your baby's neck.  Increased thickness of the skin in this area has been linked to an increased risk of genetic abnormalities like Down Syndrome.  A second visit may be required if the baby is not in the correct position.  The ultrasound results are combined with a finger stick blood test to increase the sensitivity of the test.  You will need to schedule an appointment with the Maternal Fetal Medicine office.  Address and phone number below:  Please verify insurance coverage:  CPT code: 99814 (melendez) or 35348 (twins)  Diagnosis: Genetic Screening- Z36.8A  Maternal Fetal Medicine  Dr. Banda, Dr. Flanagan, Dr. Orellana, and Dr. Elizabeth  [address] [place] [id number]  Phone [phone]  Fax [phone]    [2] Cell-Free DNA testing (NIPT)- This is a blood test done any time after 10-11  weeks which screens for genetic abnormalities like Down Syndrome.  It is greater than 98% sensitive but requires an amniocentesis for confirmation if abnormal.  It can also tell you the sex of the baby.  CPT code: 71407  Diagnosis code: Genetic screening- Z36.8A  Testing options:   Elle- preferred for IHP patients or all patients.  Please call [phone] or go to [employer]/[email or web address] to review billing, prior authorizations or referrals  [id number]- Optional for all insurance plans except [facility].  Please call [email or web address] at [phone] or go to [email or web address] to review billing, prior authorizations, or referrals      [3]  Quad Screen (also called AFP-plus or Tetra Screen)-  This is a simple blood test which screens for both genetic abnormalities like Down Syndrome and neural tube defects (NTDs), such as spina bifida (an abnormal opening in the spine which can cause paralysis).  It is usually performed around 16-20 weeks.  If the Quad screen comes back abnormal, it does not mean that your baby has an abnormality.  It only means that there is an increased risk of an abnormality.  Additional testing such as a Level II Ultrasound or Amniocentesis may be recommended (see below).  This test is less accurate at picking up genetic abnormalities than the cell-free DNA test.  If you have test #1 or 2, then usually only the AFP part of the Quad screen would be performed to screen for NTD's (see below).  Please verify insurance coverage:  CPT code: 03074  Diagnosis code: Genetic screening- Z36.8A    [4]  Alpha Fetoprotein (AFP, MSAFP)- This is a simple blood test that screens for neural tube defects such as spina bifida (an abnormal opening in the spine).  It is usually performed around 16-20 weeks.  Additional testing such as a Level II ultrasound may be recommended for an abnormal test result.  Please verify insurance coverage:  CPT code: [id number] Diagnosis code: Genetic Screening-  Z36.8A    ---------------------------------------------------------------------------------------------------------------    Carrier screening:     [5] Cystic Fibrosis/SMA Carrier Screening- Cystic Fibrosis is a genetic disease which causes lung problems in the infant.  SMA (spinal muscular atrophy) is a genetic disease that affects the nerve cells of the spinal cord.  These genetic defects would need to be passed from both parents to the child.  A blood test is performed on the mother, and if her test is positive, then the father should also be tested. These tests can be done at any time in the pregnancy, usually in the first trimester with your initial OB labs.  All babies are screened for cystic fibrosis after birth.  Please verify insurance coverage:  Cystic Fibrosis CPT Code: 53438 Diagnosis code: Cystic Fibrosis screening- Z13.228  SMA CPT Code: 68298 Diagnosis code: Genetic Screening- Z36.8A or Testing for Genetic Disease Carrier- Z13.71    [6] Hemoglobinopathy carrier screening: The hemoglobinopathies are heterogeneous genetic disorders of hemoglobin (Hb) typically inherited in an autosomal recessive pattern. The clinical presentation ranges from asymptomatic in carriers to mild to severe disease in homozygotes and compound heterozygotes. At the severe end of the spectrum, hemoglobinopathies impact quality of life, require life-long care (typically with regular blood transfusions), and can shorten life expectancy. In the [place], the American College of Obstetricians and Gynecologists (ACOG) recommends carrier screening and counseling for genetic conditions, ideally before pregnancy. Firstline for hemoglobinopathy carrier screening, includes a CBC evaluating red cell indices in all pregnant individuals [17]. A hemoglobin electrophoresis (or other protein chemistry method) is performed in addition to a CBC if there is suspicion of hemoglobinopathy based on ethnicity (, Mediterranean, Middle  Eastern, Southeast , or [place] descent) or if red cell indices show a low mean MCV or MCH. Characteristics other than ethnicity that are associated with a higher risk for an individual to be a hemoglobinopathy carrier include a history of chronic anemia or stillbirth, a relative with a hemoglobin structural variant or thalassemia, and consanguinity In 2022 ACOG updated its recommendations to offer universal hemoglobinopathy testing to people planning pregnancy or at the initial prenatal visit if no prior testing results are available for interpretation. This is based on a high frequency of a hemoglobinopathy trait, in particular S trait, in the [place] and noting that race and self-identified ethnicity are poor alternatives for genetics.  Hemoglobin electrophoresis: Send out lab to [employer].  CPT Code: 05088 or 91472 or 84482 Diagnosis code: Z13.0 Encounter screening for hematological disorder    Pretty Simple Carrier Screening: A carrier screening panel is available that includes cystic fibrosis, spinal muscular atrophy, hemoglobinopathy and additional autosomal recessive or X-linked disorders.  A full review of the carrier screening panel was available via [employer] website. Please call [phone] or go to [email or web address]/empower to review billing, prior authorizations or referrals.       ---------------------------------------------------------------------------------------------------------------    [7]  Level II Ultrasound-  This is an abdominal ultrasound done at approximately 20-21 weeks by a perinatology specialist (OMA) at [facility] which looks at many of the baby's internal structures.  Abnormalities in certain structures have been associated with an increased risk of genetic abnormalities.  It also screens for NTD's.  This ultrasound would replace the Level I Ultrasound that we normally perform at our office around the same time.    [8]  Amniocentesis-  During this procedure, a needle is  passed through your abdominal wall to withdrawal some amniotic fluid from around the baby.  It screens for both genetic abnormalities and NTD's and is usually performed around 15-16 weeks.  This test has the highest accuracy for detecting genetic abnormalities, but there may be a small risk of miscarriage or other complications.  A similar procedure called Chorionic Villus Sampling (CVS) is performed by passing a catheter through the vagina to remove a small sample of tissue from the placenta (afterbirth).  CVS may have a higher risk of miscarriage and other rare complications compared to amniocentesis but can be performed earlier in pregnancy.  Amniocentesis is often recommended/offered if any of the previously mentioned tests come back abnormal.  A pamphlet is available if you would like more information about this option.  If you decide to have an amniocentesis, the other tests would be unnecessary.      The above information covers some of the basics.  Pamphlets on the Cell-Free DNA test, Quad Screen and Cystic Fibrosis test should be in your prenatal packet.  Your doctor will discuss this information in more detail, and feel free to ask additional questions.  Also, remember that all of these tests are optional, and will only be performed at your request.  Testing that needs to be done through the perinatologist's office may require 2-3 weeks lead time to schedule.     Foods to Avoid During Pregnancy  Deli Meats- You may eat deli meats from the deli.  If you eat deli meats in the package, it may be contaminated with Listeria. Heat all deli meats in a package to 165 degrees Fahrenheit before eating, even if the label states the meat is “pre-cooked”.    Soft Cheeses and Unpasteurized Products - You may eat soft cheeses that are pasteurized.  Please avoid all soft cheeses, milk or juice that is unpasteurized.  Fish- avoid fish that contains a high level of mercury. These include but are not limited to; Valerie  Orange Roughy, Tile Fish, Shark, Swordfish, and Brando Mackerel. Please see chart below for good fish choices in pregnancy. Also avoid any raw, uncooked shellfish such as oysters, clams, or sushi.    Make sure to cook all meats; including ground beef, pork, and poultry to their desired temperatures before consuming. This reduces the chance of a food borne illness.  Caffeine- limit to 200 mg or an 8oz cup daily or less.   Alcohol- no amount of alcohol is determined to be safe in pregnancy. Do not drink any alcoholic beverages while pregnant.  Medications Safe in Pregnancy  The following over-the-counter medications may be taken safely after 12 weeks gestation by any patient who is pregnant.  Please follow the instructions on the package for adult dosage.  If you experience any symptoms prior to 12 weeks, please call the office at [phone].      Colds/Congestion: Flonase, Saline Nasal Spray, Neti Pot, Plain Robitussin, Robitussin DM, Mucinex DM, Vicks 44 E, Vicks Vapor rub, Cough drops without Zinc or Sudafed.   Contact your doctor if you have a persistent fever over 100.4, shortness of breath, coughing up green mucus, or a sore throat that persists from more than 3 days    Diarrhea: Imodium, Maalox Anti-Diarrheal or Kaopectate  Contact the office if you have diarrhea for more than 3 days.    Allergies: Benadryl, Claritin or Zyrtec    Hemorrhoids: Tucks pads, Preparation H, Annusol, Sitz bath or Witch hazel  Eat a high fiber diet and drink plenty of fluids.    Yeast: Monistat 3 or 7  Call if your symptoms do not improve, or if you experience vaginal bleeding, or a watery discharge.    Constipation: Metamucil, Colace, fiber supplement, Milk of Magnesia or Dulcolax  Drink plenty of fluids, eat high-fiber foods and take the above laxatives sporadically.     Headache or Mild aches and pain: Extra Strength Tylenol     Gas: Gas X, Gelusil, Papaya Tablets, Maalox, Mylicon or Mylanta Gas    Heartburn: Tums, Mylanta, Pepcid  AC or Maalox    Sore throat: Alcohol free Chloraseptic spray or Lozenges     Nausea and Vomiting: ½ tablet Unisom plus 100mg of Vitamin B6 at bedtime (may increase B6 up to 200mg per day), Mercedes root tea or raspberry leaf tea    Insomnia: Tylenol PM, Vitamin B6 50mg, warm bath or milk, Unisom, Nytol or Sominex.     We have listed a few medications for common symptoms seen in pregnancy.  Please contact the office if you are unsure about any over the counter medications that are not listed above.

## 2025-06-12 NOTE — PROGRESS NOTES
[facility]  Obstetrics and Gynecology    Subjective:     Jennifer Ha is a 40 year old  female presents with c/o secondary amenorrhea and positive pregnancy test. The patient was recommended to return for further evaluation. The patient reports surprised but relieved that pregnancy is healthy. Was concerned because had heavier vaginal bleeding last week and believed she was miscarrying. States no further bleeding since last week. Denies any symptoms of N/V, abdominal/pelvic pain, or abnormal discharge. Pt has not been taking PTU for Graves disease, states she doesn't like taking medications. Has not started a PNV. Expressed importance of euthyroid state in pregnancy and recommended pt to restart PTU as well as follow up with endocrinologist for blood work review. Patient's last menstrual period was 2025 (exact date).     Pregnancy planned?: yes  Pregnancy desired? yes    Review of Systems:  General: no complaints per category. See HPI for additional information.   Breast: no complaints per category. See HPI for additional information.   Respiratory: no complaints per category. See HPI for additional information.   Cardiovascular: no complaints per category. See HPI for additional information.   GI: no complaints per category. See HPI for additional information.   : no complaints per category. See HPI for additional information.   Heme: no complaints per category. See HPI for additional information.     OB History:   OB History    Para Term  AB Living   5 2 2  2 2   SAB IAB Ectopic Multiple Live Births   2    2      # Outcome Date GA Lbr Mak/2nd Weight Sex Type Anes PTL Lv   5 Current            4 2023        FD   3 Term 19 38w5d  8 lb 5 oz (3.771 kg) F CS-LTranv EPI  SARAH   2 SAB 2018 5w0d    SAB   FD      Birth Comments: no D&C   1 Term 16 40w3d  7 lb 5 oz (3.317 kg) F Vag-Spont EPI  SARAH      Birth Comments: Had a bad experience with Epidural.          Gyne History:  Menarche: 14  Period Cycle (Days): pregnant  Period Duration (Days): n/a  Period Flow: n/a  Hx Prior Abnormal Pap: No  Pap Date:  ( per pt)  Pap Result Notes: 2019 -wnl  Patient's last menstrual period was 2025 (exact date).      Meds:  Medications Ordered Prior to Encounter[1]    All:  Allergies[2]    PMH:  Past Medical History[3]    PSH:  Past Surgical History[4]    Objective:     Vitals:    25 1402   BP: 122/70   Pulse: 79   Weight: 212 lb 6 oz (96.3 kg)   Height: 65\"         Body mass index is 35.34 kg/m².    General: AAO.NAD.   CVS exam: normal peripheral perfusion  Chest: non-labored breathing, no tachypnea   Abdominal exam: deferred   Pelvic exam: deferred        Imaging:  First Trimester US   GA by LMP: 7w4d   GA by US: 8w0d   FHT: 153 bpm   Impression: Single live IUP. Early viable. Gestational sac, yolk sac and fetal pole seen. Both ovaries wnl. No free fluid. Cardiac activity noted. Cervix appears closed and wnl.   TYLER 2026 by LMP c/w 8 wk US     Reviewed and electronically signed by: Alix Apple DO, 25, 2:36 PM        Assessment:     Jennifer Ha is a 40 year old  female who presents for secondary amenorrhea and positive pregnancy test          Plan:     Patient Active Problem List    Diagnosis    Prediabetes     Hgb A1c 6.1 (2025)  [ ] BASA  [ ] early 1hr GTT      Graves disease     Prescribed PTU, pt non-compliant with medications  Follows with endocrinologist  [ ] per trimester TFTs      B12 deficiency    Vitamin D deficiency    Iron deficiency    Thyroid eye disease    AMA (advanced maternal age) multigravida 35+ (HCC)     [ ] MFM consult and Level II US      Previous  section     Was breech and initially scheduled for PCS, but turned vertex and vaginal trial offered --> Fetal bradycardia and unstable lie, dilated to 4-5 cm  Desires TOLAC      History of umbilical hernia     After PCS, no mesh per pt      Threatened  miscarriage (HCC)     6/2023             Secondary amenorrhea  - a/w positive pregnancy test  - Ultrasound reviewed and discussed with patient, refer above  - Pt counseled on safety, diet, exercise, caffiene, tobacco, ETOH, sexual activity, ER precautions, diet, exercise, appropriate otc medication use, substance abuse   - Counseled on taking a PNV with 0.4mg of folic acid   - genetic screening testing d/w patient and family, pt declines invasive diagnostic testing and considering first versus second trimester screening   -Carrier screening, neural tube defect screening and hemoglobinopathy screening reviewed and discussed with patient; information provided and patient considering options and recommended to request desired screening at follow-up visit  - advised to follow up to establish prenatal care   - SAB precautions provided   - d/w nausea and vomiting in pregnancy including vitamin B 6 and unisom   - COVID 19 precautions provided, recommend vaccination and booster if/when applicable   -Travel precautions provided, patient advised to not travel beyond 36 weeks as long as the pregnancy remains low risk.  Advised not to travel beyond 28 weeks for high risk pregnancy. Recommend compression socks, increase water intake and movement during traveling to prevent blood clots.    All of the findings and plan were discussed with the patient.  She notes understanding and agrees with the plan of care.  All questions were answered to the best of my ability at this time.      Total patient time was 30 minutes in evaluation, consultation, and coordination of care. This included face to face and non-face to face actions. The patient's questions and concerns that were addressed.     RTC in 4 weeks for NOB visit or sooner if needed     DO ADEOLA Pollock - OBOFE     Discussed with patient that there will not be further notification of normal or benign results other than receiving results on [employer]. A [employer] message or  telephone call will be placed by the physician and/or office staff if results are abnormal.         Note to patient and family   The 21st Century Cures Act makes medical notes available to patients in the interest of transparency.  However, please be advised that this is a medical document.  It is intended as fyql-vw-uase communication.  It is written and medical language may contain abbreviations or verbiage that are technical and unfamiliar.  It may appear blunt or direct.  Medical documents are intended to carry relevant information, facts as evident, and the clinical opinion of the practitioner.      This note could include assistance by Dragon voice recognition. Errors in content may be related to improper recognition by the system; efforts to review and correct have been done but errors may still exist.          [1]   Current Outpatient Medications on File Prior to Visit   Medication Sig Dispense Refill    OneTouch UltraSoft 2 Lancets Does not apply Misc 1 each as needed. (Patient taking differently: 1 each as needed. Pt states she is taking it once a day) 100 each 0    propylthiouracil 50 MG Oral Tab Take 3 tablets (150 mg total) by mouth 3 (three) times daily. 270 tablet 1    cyanocobalamin 1000 MCG Oral Tab Take 1 tablet (1,000 mcg total) by mouth daily.      ergocalciferol 1.25 MG (37309 UT) Oral Cap 1 capsule (50,000 Units total).      Ferrous Sulfate 325 (65 Fe) MG Oral Tab 1 tablet Orally every other day for 90 day(s)      loratadine (CLARITIN) 10 MG Oral Tab Take 1 tablet (10 mg total) by mouth daily.      Glucose Blood (ONETOUCH ULTRA) In Vitro Strip Test fasting glucose and 2 hours after meal glucose level. 200 strip 1    Blood Glucose Monitoring Suppl (ONETOUCH ULTRA 2) w/Device Does not apply Kit Use as directed. 1 kit 0    propylthiouracil 50 MG Oral Tab Take 2 tablets daily for a total of 100 mg. 180 tablet 0     No current facility-administered medications on file prior to visit.   [2]    Allergies  Allergen Reactions    Seasonal UNKNOWN   [3]   Past Medical History:   Graves disease   [4]   Past Surgical History:  Procedure Laterality Date    Hc  section level i      Repair ing hernia,7+y/o,reducibl

## 2025-06-26 DIAGNOSIS — E05.90 HYPERTHYROIDISM AFFECTING PREGNANCY IN FIRST TRIMESTER (HCC): ICD-10-CM

## 2025-06-26 DIAGNOSIS — O99.281 HYPERTHYROIDISM AFFECTING PREGNANCY IN FIRST TRIMESTER (HCC): ICD-10-CM

## 2025-06-26 DIAGNOSIS — E05.00 GRAVES DISEASE: Primary | ICD-10-CM

## 2025-06-27 ENCOUNTER — TELEPHONE (OUTPATIENT)
Dept: OBGYN CLINIC | Facility: CLINIC | Age: 40
End: 2025-06-27

## 2025-06-27 NOTE — TELEPHONE ENCOUNTER
9w5d    Last bleeding episode was beginning of month.  Was doing housework scrubbing floors and mopping, and felt some cramping.  Came home from errands/lunch and saw quarter size blood stained underwear, dark red.  Wiped and saw additional dark red blood, about another quarter sized stain.  No additional bleeding seen.  Mild abdominal cramping present.  Denies recent intercourse.   is out of town, caring for 2 children and would like to avoid going to ED.  Discussed okay to continue to monitor for now since no additional bleeding seen.   If bleeding increased with bright red blood seen or resembling menses flow, or if abdominal cramping becomes worse then what patient experiences in normal menses cycle patient instructed to go to ED or Urgent Care for evaluation.  Patient expressing concern over recommendations as she does not have  available. Advised patient office visit would not be appropriate for symptoms she is reporting, as imaging and labs would be required for evaluation. Discussed she may take children with her to ED if she doesn't have options for , she would not be turned away by ED if she requires evaluation.  Patient verbalized understanding.

## 2025-06-27 NOTE — TELEPHONE ENCOUNTER
Pt called stating she is bleeding and would like to speak to a nurse regarding her concerns. Please advise, thank you.

## 2025-07-01 NOTE — TELEPHONE ENCOUNTER
RN re ordered labs for [employer].    [facility] sent asking patient to have these done prior to follow up on 7/17/25.

## 2025-07-11 ENCOUNTER — TELEPHONE (OUTPATIENT)
Dept: OBGYN CLINIC | Facility: CLINIC | Age: 40
End: 2025-07-11

## 2025-07-16 NOTE — TELEPHONE ENCOUNTER
Multiple attempts to reach pt, and after reviewing her chart, it looks like pt has an appt with [facility] on 8/12/25.  Canceled future appt, pregnancy episode not started with us.

## 2025-07-23 LAB
T3, TOTAL: 181 NG/DL (ref 76–181)
T4, FREE: 1 NG/DL (ref 0.8–1.8)
TSH: 0.11 MIU/L

## 2025-08-01 ENCOUNTER — OFFICE VISIT (OUTPATIENT)
Facility: CLINIC | Age: 40
End: 2025-08-01

## 2025-08-01 VITALS
WEIGHT: 215.38 LBS | HEART RATE: 91 BPM | DIASTOLIC BLOOD PRESSURE: 70 MMHG | RESPIRATION RATE: 18 BRPM | OXYGEN SATURATION: 99 % | SYSTOLIC BLOOD PRESSURE: 128 MMHG | HEIGHT: 65 IN | BODY MASS INDEX: 35.88 KG/M2

## 2025-08-01 DIAGNOSIS — O99.810 PREDIABETES IN MOTHER DURING PREGNANCY (HCC): ICD-10-CM

## 2025-08-01 DIAGNOSIS — O99.281 HYPERTHYROIDISM AFFECTING PREGNANCY IN FIRST TRIMESTER (HCC): Primary | ICD-10-CM

## 2025-08-01 DIAGNOSIS — E05.90 HYPERTHYROIDISM AFFECTING PREGNANCY IN FIRST TRIMESTER (HCC): Primary | ICD-10-CM

## 2025-08-01 DIAGNOSIS — E05.00 GRAVES DISEASE: ICD-10-CM

## 2025-08-01 PROCEDURE — 99214 OFFICE O/P EST MOD 30 MIN: CPT | Performed by: STUDENT IN AN ORGANIZED HEALTH CARE EDUCATION/TRAINING PROGRAM

## 2025-08-08 PROBLEM — O99.810 PREDIABETES IN MOTHER DURING PREGNANCY (HCC): Status: ACTIVE | Noted: 2025-08-08

## 2025-08-14 ENCOUNTER — PATIENT MESSAGE (OUTPATIENT)
Facility: CLINIC | Age: 40
End: 2025-08-14

## 2025-08-14 DIAGNOSIS — E05.00 GRAVES DISEASE: Primary | ICD-10-CM

## (undated) NOTE — LETTER
Date & Time: 6/13/2023, 3:41 AM  Patient: Sue Back  Encounter Provider(s):    Leatha Rizzo MD       To Whom It May Concern:    Kiesha Garza was seen and treated in our department on 6/12/2023. She should not return to work until June 16, 2023 .     If you have any questions or concerns, please do not hesitate to call.        _____________________________  Physician/APC Signature